# Patient Record
Sex: FEMALE | Race: WHITE | RURAL
[De-identification: names, ages, dates, MRNs, and addresses within clinical notes are randomized per-mention and may not be internally consistent; named-entity substitution may affect disease eponyms.]

---

## 2021-09-21 ENCOUNTER — HOSPITAL ENCOUNTER (EMERGENCY)
Facility: HOSPITAL | Age: 61
Discharge: ANOTHER HOSPITAL | End: 2021-09-21
Attending: HOSPITALIST
Payer: OTHER GOVERNMENT

## 2021-09-21 ENCOUNTER — APPOINTMENT (OUTPATIENT)
Dept: GENERAL RADIOLOGY | Facility: HOSPITAL | Age: 61
End: 2021-09-21
Payer: OTHER GOVERNMENT

## 2021-09-21 ENCOUNTER — APPOINTMENT (OUTPATIENT)
Dept: CT IMAGING | Facility: HOSPITAL | Age: 61
End: 2021-09-21
Payer: OTHER GOVERNMENT

## 2021-09-21 VITALS
SYSTOLIC BLOOD PRESSURE: 108 MMHG | WEIGHT: 190 LBS | TEMPERATURE: 98.2 F | OXYGEN SATURATION: 97 % | BODY MASS INDEX: 27.2 KG/M2 | RESPIRATION RATE: 22 BRPM | HEIGHT: 70 IN | HEART RATE: 123 BPM | DIASTOLIC BLOOD PRESSURE: 71 MMHG

## 2021-09-21 DIAGNOSIS — I26.99 BILATERAL PULMONARY EMBOLISM (HCC): ICD-10-CM

## 2021-09-21 DIAGNOSIS — R77.8 ELEVATED TROPONIN: ICD-10-CM

## 2021-09-21 DIAGNOSIS — J12.82 PNEUMONIA DUE TO COVID-19 VIRUS: Primary | ICD-10-CM

## 2021-09-21 DIAGNOSIS — J96.01 ACUTE RESPIRATORY FAILURE WITH HYPOXIA (HCC): ICD-10-CM

## 2021-09-21 DIAGNOSIS — R79.89 ELEVATED BRAIN NATRIURETIC PEPTIDE (BNP) LEVEL: ICD-10-CM

## 2021-09-21 DIAGNOSIS — U07.1 PNEUMONIA DUE TO COVID-19 VIRUS: Primary | ICD-10-CM

## 2021-09-21 LAB
A/G RATIO: 1.1 (ref 0.8–2)
ALBUMIN SERPL-MCNC: 3.6 G/DL (ref 3.4–4.8)
ALP BLD-CCNC: 66 U/L (ref 25–100)
ALT SERPL-CCNC: 16 U/L (ref 4–36)
ANION GAP SERPL CALCULATED.3IONS-SCNC: 13 MMOL/L (ref 3–16)
APTT: 31 SEC (ref 22.5–34.9)
AST SERPL-CCNC: 18 U/L (ref 8–33)
BASE EXCESS ARTERIAL: -5.2 MMOL/L (ref -3–3)
BASOPHILS ABSOLUTE: 0 K/UL (ref 0–0.1)
BASOPHILS RELATIVE PERCENT: 0.3 %
BILIRUB SERPL-MCNC: 0.7 MG/DL (ref 0.3–1.2)
BUN BLDV-MCNC: 7 MG/DL (ref 6–20)
CALCIUM SERPL-MCNC: 8.6 MG/DL (ref 8.5–10.5)
CHLORIDE BLD-SCNC: 103 MMOL/L (ref 98–107)
CO2: 22 MMOL/L (ref 20–30)
CREAT SERPL-MCNC: 0.6 MG/DL (ref 0.4–1.2)
D DIMER: >4 UG/ML FEU (ref 0–0.6)
EOSINOPHILS ABSOLUTE: 0 K/UL (ref 0–0.4)
EOSINOPHILS RELATIVE PERCENT: 0 %
FIO2: 0.32 %
GFR AFRICAN AMERICAN: >59
GFR NON-AFRICAN AMERICAN: >60
GLOBULIN: 3.4 G/DL
GLUCOSE BLD-MCNC: 134 MG/DL (ref 74–106)
HCO3 ARTERIAL: 16.6 MMOL/L (ref 22–26)
HCT VFR BLD CALC: 37.3 % (ref 37–47)
HEMOGLOBIN: 12.5 G/DL (ref 11.5–16.5)
IMMATURE GRANULOCYTES #: 0.1 K/UL
IMMATURE GRANULOCYTES %: 0.6 % (ref 0–5)
INR BLD: 1.23 (ref 0.88–1.11)
LACTIC ACID: 1.8 MMOL/L (ref 0.4–2)
LYMPHOCYTES ABSOLUTE: 0.9 K/UL (ref 1.5–4)
LYMPHOCYTES RELATIVE PERCENT: 8.4 %
MCH RBC QN AUTO: 29.1 PG (ref 27–32)
MCHC RBC AUTO-ENTMCNC: 33.5 G/DL (ref 31–35)
MCV RBC AUTO: 86.7 FL (ref 80–100)
MONOCYTES ABSOLUTE: 1.2 K/UL (ref 0.2–0.8)
MONOCYTES RELATIVE PERCENT: 11 %
NEUTROPHILS ABSOLUTE: 8.5 K/UL (ref 2–7.5)
NEUTROPHILS RELATIVE PERCENT: 79.7 %
O2 SAT, ARTERIAL: 95.2 %
O2 THERAPY: ABNORMAL
PCO2 ARTERIAL: 22.3 MMHG (ref 35–45)
PDW BLD-RTO: 12.7 % (ref 11–16)
PH ARTERIAL: 7.49 (ref 7.35–7.45)
PLATELET # BLD: 215 K/UL (ref 150–400)
PMV BLD AUTO: 9.8 FL (ref 6–10)
PO2 ARTERIAL: 75.4 MMHG (ref 80–100)
POTASSIUM REFLEX MAGNESIUM: 3.9 MMOL/L (ref 3.4–5.1)
PRO-BNP: 3944 PG/ML (ref 0–1800)
PROTHROMBIN TIME: 14.9 SEC (ref 11.6–13.8)
RBC # BLD: 4.3 M/UL (ref 3.8–5.8)
SODIUM BLD-SCNC: 138 MMOL/L (ref 136–145)
TCO2 ARTERIAL: 17.3 MMOL/L (ref 24–30)
TOTAL PROTEIN: 7 G/DL (ref 6.4–8.3)
TROPONIN: 0.41 NG/ML
WBC # BLD: 10.7 K/UL (ref 4–11)

## 2021-09-21 PROCEDURE — 36600 WITHDRAWAL OF ARTERIAL BLOOD: CPT

## 2021-09-21 PROCEDURE — 85730 THROMBOPLASTIN TIME PARTIAL: CPT

## 2021-09-21 PROCEDURE — 6360000004 HC RX CONTRAST MEDICATION: Performed by: HOSPITALIST

## 2021-09-21 PROCEDURE — 71045 X-RAY EXAM CHEST 1 VIEW: CPT

## 2021-09-21 PROCEDURE — 96367 TX/PROPH/DG ADDL SEQ IV INF: CPT

## 2021-09-21 PROCEDURE — 96365 THER/PROPH/DIAG IV INF INIT: CPT

## 2021-09-21 PROCEDURE — 2580000003 HC RX 258: Performed by: HOSPITALIST

## 2021-09-21 PROCEDURE — 96372 THER/PROPH/DIAG INJ SC/IM: CPT

## 2021-09-21 PROCEDURE — 96375 TX/PRO/DX INJ NEW DRUG ADDON: CPT

## 2021-09-21 PROCEDURE — 80053 COMPREHEN METABOLIC PANEL: CPT

## 2021-09-21 PROCEDURE — 36415 COLL VENOUS BLD VENIPUNCTURE: CPT

## 2021-09-21 PROCEDURE — 85025 COMPLETE CBC W/AUTO DIFF WBC: CPT

## 2021-09-21 PROCEDURE — 6360000002 HC RX W HCPCS: Performed by: HOSPITALIST

## 2021-09-21 PROCEDURE — 71275 CT ANGIOGRAPHY CHEST: CPT

## 2021-09-21 PROCEDURE — 84484 ASSAY OF TROPONIN QUANT: CPT

## 2021-09-21 PROCEDURE — 87040 BLOOD CULTURE FOR BACTERIA: CPT

## 2021-09-21 PROCEDURE — 83605 ASSAY OF LACTIC ACID: CPT

## 2021-09-21 PROCEDURE — 82803 BLOOD GASES ANY COMBINATION: CPT

## 2021-09-21 PROCEDURE — 99285 EMERGENCY DEPT VISIT HI MDM: CPT

## 2021-09-21 PROCEDURE — 96366 THER/PROPH/DIAG IV INF ADDON: CPT

## 2021-09-21 PROCEDURE — 85610 PROTHROMBIN TIME: CPT

## 2021-09-21 PROCEDURE — 85379 FIBRIN DEGRADATION QUANT: CPT

## 2021-09-21 PROCEDURE — 6370000000 HC RX 637 (ALT 250 FOR IP): Performed by: HOSPITALIST

## 2021-09-21 PROCEDURE — 83880 ASSAY OF NATRIURETIC PEPTIDE: CPT

## 2021-09-21 RX ORDER — DEXAMETHASONE SODIUM PHOSPHATE 10 MG/ML
10 INJECTION INTRAMUSCULAR; INTRAVENOUS ONCE
Status: COMPLETED | OUTPATIENT
Start: 2021-09-21 | End: 2021-09-21

## 2021-09-21 RX ORDER — FUROSEMIDE 10 MG/ML
40 INJECTION INTRAMUSCULAR; INTRAVENOUS ONCE
Status: COMPLETED | OUTPATIENT
Start: 2021-09-21 | End: 2021-09-21

## 2021-09-21 RX ORDER — HEPARIN SODIUM 1000 [USP'U]/ML
80 INJECTION, SOLUTION INTRAVENOUS; SUBCUTANEOUS ONCE
Status: CANCELLED | OUTPATIENT
Start: 2021-09-21 | End: 2021-09-21

## 2021-09-21 RX ORDER — HEPARIN SODIUM 1000 [USP'U]/ML
40 INJECTION, SOLUTION INTRAVENOUS; SUBCUTANEOUS PRN
Status: CANCELLED | OUTPATIENT
Start: 2021-09-21

## 2021-09-21 RX ORDER — 0.9 % SODIUM CHLORIDE 0.9 %
1000 INTRAVENOUS SOLUTION INTRAVENOUS ONCE
Status: COMPLETED | OUTPATIENT
Start: 2021-09-21 | End: 2021-09-21

## 2021-09-21 RX ORDER — ASPIRIN 81 MG/1
324 TABLET, CHEWABLE ORAL ONCE
Status: COMPLETED | OUTPATIENT
Start: 2021-09-21 | End: 2021-09-21

## 2021-09-21 RX ORDER — ALBUTEROL SULFATE 90 UG/1
2 AEROSOL, METERED RESPIRATORY (INHALATION) EVERY 6 HOURS PRN
Status: DISCONTINUED | OUTPATIENT
Start: 2021-09-21 | End: 2021-09-21 | Stop reason: HOSPADM

## 2021-09-21 RX ORDER — HEPARIN SODIUM 10000 [USP'U]/100ML
5-30 INJECTION, SOLUTION INTRAVENOUS CONTINUOUS
Status: CANCELLED | OUTPATIENT
Start: 2021-09-21

## 2021-09-21 RX ORDER — HEPARIN SODIUM 1000 [USP'U]/ML
80 INJECTION, SOLUTION INTRAVENOUS; SUBCUTANEOUS PRN
Status: CANCELLED | OUTPATIENT
Start: 2021-09-21

## 2021-09-21 RX ADMIN — IOPAMIDOL 100 ML: 755 INJECTION, SOLUTION INTRAVENOUS at 13:58

## 2021-09-21 RX ADMIN — FUROSEMIDE 40 MG: 10 INJECTION, SOLUTION INTRAMUSCULAR; INTRAVENOUS at 12:54

## 2021-09-21 RX ADMIN — ASPIRIN 324 MG: 81 TABLET, CHEWABLE ORAL at 12:58

## 2021-09-21 RX ADMIN — SODIUM CHLORIDE 1000 ML: 9 INJECTION, SOLUTION INTRAVENOUS at 11:44

## 2021-09-21 RX ADMIN — DEXAMETHASONE SODIUM PHOSPHATE 10 MG: 10 INJECTION INTRAMUSCULAR; INTRAVENOUS at 12:01

## 2021-09-21 RX ADMIN — AZITHROMYCIN DIHYDRATE 500 MG: 500 INJECTION, POWDER, LYOPHILIZED, FOR SOLUTION INTRAVENOUS at 13:02

## 2021-09-21 RX ADMIN — SODIUM CHLORIDE 1000 ML: 9 INJECTION, SOLUTION INTRAVENOUS at 14:30

## 2021-09-21 RX ADMIN — CEFTRIAXONE 1000 MG: 1 INJECTION, POWDER, FOR SOLUTION INTRAMUSCULAR; INTRAVENOUS at 12:05

## 2021-09-21 RX ADMIN — ALBUTEROL SULFATE 2 PUFF: 90 AEROSOL, METERED RESPIRATORY (INHALATION) at 14:32

## 2021-09-21 RX ADMIN — ENOXAPARIN SODIUM 90 MG: 100 INJECTION SUBCUTANEOUS at 12:59

## 2021-09-21 ASSESSMENT — PAIN DESCRIPTION - FREQUENCY: FREQUENCY: INTERMITTENT

## 2021-09-21 ASSESSMENT — PAIN SCALES - GENERAL: PAINLEVEL_OUTOF10: 5

## 2021-09-21 ASSESSMENT — PAIN DESCRIPTION - LOCATION: LOCATION: RIB CAGE

## 2021-09-21 ASSESSMENT — PAIN DESCRIPTION - ORIENTATION: ORIENTATION: LEFT

## 2021-09-21 NOTE — ED NOTES
Pt on 15L NRB at this time with O2 sat at 98%- NRB removed and patient placed on O2 via NC at [employer], RN  09/21/21 9903

## 2021-09-21 NOTE — ED NOTES
Dr. Austen Rooney on phone with daughter per dr. Osvaldo Townsend updating her on patient status      Radha Rainey RN  09/21/21 1152

## 2021-09-21 NOTE — ED NOTES
Pt presented to ER via [facility]- c/o increased SOA over past couple of days- c/o increased weakness over past couple days- c/o left rib pain with deep breathing and tender to touch- states she tested positive on home test for COVID on 9/6/21- pt states PCP gave her an albuterol inhaler to take at home with no improvement- pt states she has no appetite at this time      Susy Olivas RN  09/21/21 1426

## 2021-09-21 NOTE — ED PROVIDER NOTES
[facility] ENCOUNTER      Pt Name: Mary Dutton  MRN: [id number]  YOB: 1960  Date of evaluation: 9/21/2021  Provider: Godwin Arana, [address] [place] [address]       Chief Complaint   Patient presents with    Shortness of Breath     pt c/o increased SOA- stated she tested positivie on home COVID test on Sept 6th    Fatigue     worsening over past couple days    Rib Pain     pt c/o pain to left rib area- tender to touch         HISTORY OF PRESENT ILLNESS  (Location/Symptom, Timing/Onset, Context/Setting, Quality, Duration, Modifying Factors, Severity.)   Mary Dutton is a 62 y.o. female who presents to the emergency department for COVID-19 symptoms. Patient states that she tested positive for COVID-19 on 6 September Labor Day. States that her  also tested positive. They believe they most likely got exposed when he had to have an angioplasty performed and they spent the entire day at the hospital.  Patient states that she thought she was actually getting better but over the last several days as she has had worsening of her symptoms. Patient states that she now has increased shortness of breath. She states is difficult for her to even get up and ambulate even to the bathroom. She does states that she feels extremely hot when she exerts herself and has extreme weakness. Patient states that she has had worsening fatigue over the last couple of days and she also complains of some mild rib or chest discomfort in the left anterior/lateral aspect of ribs she puts her hand right under her breast area and states that it is uncomfortable. She does have a cough. It is dry in nature. Denies any chest pain except for the discomfort to the ribs itself. Denies any vomiting or diarrhea but she states she has had some nausea. She is not been eating or drinking much she is because she just has not had much of an appetite.   She states that she initially lost her taste and smell on the first part of when her illness for started but she states that has returned that she has just not had the /energy/appetite to eat or drink much. She denies any abdominal pain out of ordinary. Denies any dysuria or change urinary frequency. She denies any headaches with the symptoms. Denies any visual changes. Denies any sore throat or earache out of ordinary. Denies any numbness tingling weakness of the extremities out of ordinary but she states that she does have sort of all full body type myalgias or aches. Denies any fevers or chills at this time but states she did have some initially. Patient was brought in by EMS and was actually on a nonrebreather initially but she was downgraded to a nasal cannula she is on 3 L at this time and she is satting 92 to 93% on the 3 L nasal cannula. Nursing notes were reviewed. REVIEW OFSYSTEMS    (2-9 systems for level 4, 10 or more for level 5)   ROS:  General:  No fevers, no chills, + weakness, +fatigue, +loss of appetite  Cardiovascular:  +chest pain, no palpitations  Respiratory:  + shortness of breath, +cough, no wheezing  Gastrointestinal:  No pain, + nausea, no vomiting, no diarrhea  Musculoskeletal:  No muscle pain, no joint pain  Skin:  No rash, no easy bruising  Neurologic:  No speech problems, no headache, no extremity weakness  Psychiatric:  No anxiety  Genitourinary:  No dysuria, no hematuria    Except as noted above the remainder of the review of systems was reviewed and negative. PAST MEDICAL HISTORY   History reviewed. No pertinent past medical history. SURGICAL HISTORY       Past Surgical History:   Procedure Laterality Date    HYSTERECTOMY      KNEE SURGERY Left          CURRENT MEDICATIONS       Previous Medications    ALBUTEROL IN    Inhale into the lungs as needed       ALLERGIES     Pcn [penicillins]    FAMILY HISTORY     History reviewed. No pertinent family history.        SOCIAL HISTORY       Social History     Socioeconomic History    Marital status: Unknown     Spouse name: None    Number of children: None    Years of education: None    Highest education level: None   Occupational History    None   Tobacco Use    Smoking status: Former Smoker    Smokeless tobacco: Never Used   Substance and Sexual Activity    Alcohol use: Not Currently    Drug use: Never    Sexual activity: None   Other Topics Concern    None   Social History Narrative    None     Social Determinants of Health     Financial Resource Strain:     Difficulty of Paying Living Expenses:    Food Insecurity:     Worried About Running Out of Food in the Last Year:     Ran Out of Food in the Last Year:    Transportation Needs:     Lack of Transportation (Medical):  Lack of Transportation (Non-Medical):    Physical Activity:     Days of Exercise per Week:     Minutes of Exercise per Session:    Stress:     Feeling of Stress :    Social Connections:     Frequency of Communication with Friends and Family:     Frequency of Social Gatherings with Friends and Family:     Attends Faith Services:     Active Member of Clubs or Organizations:     Attends Club or Organization Meetings:     Marital Status:    Intimate Partner Violence:     Fear of Current or Ex-Partner:     Emotionally Abused:     Physically Abused:     Sexually Abused:          PHYSICAL EXAM    (up to 7 for level 4, 8 or more for level 5)     ED Triage Vitals   BP Temp Temp src Pulse Resp SpO2 Height Weight   09/21/21 1106 09/21/21 1106 -- 09/21/21 1106 -- 09/21/21 1055 09/21/21 1111 09/21/21 1111   (!) 116/90 98.2 °F (36.8 °C)  123  98 % 5' 10\" (1.778 m) 190 lb (86.2 kg)       Physical Exam  General :Patient is awake, alert, oriented x3, in mild respiratory distress, ill aprearing  HEENT: Pupils are equally round and reactive to light, EOMI, conjunctivae clear. Oral mucosa is moist, no exudate.  Uvula is midline  Cardiac: Heart regular rate, rhythm, no murmurs, rubs, or gallops  Lungs: Lungs are clear to auscultation, there is no wheezing, rhonchi, or rales. There is no use of accessory muscles. CHEST: There is a very mild palpable tenderness at the anterior and lateral ribs just below the breast line on the left. There is no contusion or bruising noted. No paradoxical movement. No crepitus noted. Abdomen: Abdomen is soft, nontender, nondistended. There is no firm or pulsatile masses, no rebound rigidity or guarding. Musculoskeletal: 5 out of 5 strength in all 4 extremities. No focal muscle deficits are appreciated  Neuro: Motor intact, sensory intact, level of consciousness is normal  Dermatology: Skin is warm and dry  Psych: Mentation is grossly normal, cognition is grossly normal. Affect is appropriate. DIAGNOSTIC RESULTS     EKG: All EKG's are interpreted by the Emergency Department Physician who either signs or Co-signs this chart in the [address] a cardiologist.    The EKG interpreted by me shows sinus tachycardia. Atypical right bundle branch block pattern. Heart rate is 117 bpm, WA interval is 157 ms, QRS durations 148 ms, QT is 359 and QTc is 501 ms. No acute T wave inversions concerning for acute microischemia. No ST elevations concerning for acute myocardial infarction. RADIOLOGY:   Non-plain film images such as CT, Ultrasound and MRI are read by the radiologist. Plain radiographic images are visualized and preliminarily interpreted by the emergency physician with the below findings:      ? Radiologist's Report Reviewed:  CTA PULMONARY W CONTRAST   Final Result   1. Bilateral submassive pulmonary emboli with thrombus of the proximal lobar arteries bilaterally involving all pulmonary lobes. 2. CT evidence for right heart strain with an elevated right to left ventricular ratio. 3. Findings reflecting Covid pneumonia. These findings were discussed with Dr. Keyla Hayden at the time of report.                XR CHEST PORTABLE   Final Result   1. Multifocal peripheral airspace consolidation concordant with atypical viral pneumonia.             ED BEDSIDE ULTRASOUND:   Performed by ED Physician - none    LABS:    I have reviewed and interpreted all of the currently available lab results from this visit (ifapplicable):  Results for orders placed or performed during the hospital encounter of 09/21/21   CBC Auto Differential   Result Value Ref Range    WBC 10.7 4.0 - 11.0 K/uL    RBC 4.30 3.80 - 5.80 M/uL    Hemoglobin 12.5 11.5 - 16.5 g/dL    Hematocrit 37.3 37.0 - 47.0 %    MCV 86.7 80.0 - 100.0 fL    MCH 29.1 27.0 - 32.0 pg    MCHC 33.5 31.0 - 35.0 g/dL    RDW 12.7 11.0 - 16.0 %    Platelets 146 121 - 780 K/uL    MPV 9.8 6.0 - 10.0 fL    Neutrophils % 79.7 %    Immature Granulocytes % 0.6 0.0 - 5.0 %    Lymphocytes % 8.4 %    Monocytes % 11.0 %    Eosinophils % 0.0 %    Basophils % 0.3 %    Neutrophils Absolute 8.5 (H) 2.0 - 7.5 K/uL    Immature Granulocytes # 0.1 K/uL    Lymphocytes Absolute 0.9 (L) 1.5 - 4.0 K/uL    Monocytes Absolute 1.2 (H) 0.2 - 0.8 K/uL    Eosinophils Absolute 0.0 0.0 - 0.4 K/uL    Basophils Absolute 0.0 0.0 - 0.1 K/uL   Comprehensive Metabolic Panel w/ Reflex to MG   Result Value Ref Range    Sodium 138 136 - 145 mmol/L    Potassium reflex Magnesium 3.9 3.4 - 5.1 mmol/L    Chloride 103 98 - 107 mmol/L    CO2 22 20 - 30 mmol/L    Anion Gap 13 3 - 16    Glucose 134 (H) 74 - 106 mg/dL    BUN 7 6 - 20 mg/dL    CREATININE 0.6 0.4 - 1.2 mg/dL    GFR Non-African American >60 >59    GFR African American >59 >59    Calcium 8.6 8.5 - 10.5 mg/dL    Total Protein 7.0 6.4 - 8.3 g/dL    Albumin 3.6 3.4 - 4.8 g/dL    Albumin/Globulin Ratio 1.1 0.8 - 2.0    Total Bilirubin 0.7 0.3 - 1.2 mg/dL    Alkaline Phosphatase 66 25 - 100 U/L    ALT 16 4 - 36 U/L    AST 18 8 - 33 U/L    Globulin 3.4 g/dL   Troponin   Result Value Ref Range    Troponin 0.41 (HH) <0.30 ng/mL   Brain Natriuretic Peptide   Result Value Ref Range Pro-BNP 3,944 (H) 0 - 1,800 pg/mL   D-Dimer, Quantitative   Result Value Ref Range    D-Dimer, Quant >4.00 (HH) 0.00 - 0.60 ug/mL FEU   Lactic Acid, Plasma   Result Value Ref Range    Lactic Acid 1.8 0.4 - 2.0 mmol/L   Protime-INR   Result Value Ref Range    Protime 14.9 (H) 11.6 - 13.8 sec    INR 1.23 (H) 0.88 - 1.11   APTT   Result Value Ref Range    aPTT 31.0 22.5 - 34.9 sec   Blood Gas, Arterial   Result Value Ref Range    pH, Arterial 7.490 (H) 7.350 - 7.450    pCO2, Arterial 22.3 (L) 35.0 - 45.0 mmHg    pO2, Arterial 75.4 (L) 80.0 - 100.0 mmHg    HCO3, Arterial 16.6 (L) 22.0 - 26.0 mmol/L    Base Excess, Arterial -5.2 (L) -3.0 - 3.0 mmol/L    O2 Sat, Arterial 95.2 >92 %    TCO2, Arterial 17.3 (L) 24.0 - 30.0 mmol/L    O2 Therapy Unknown     FIO2 0.32 Not Established %        All other labs were within normal range or not returned as of this dictation.     EMERGENCY DEPARTMENT COURSE and DIFFERENTIAL DIAGNOSIS/MDM:   Vitals:    Vitals:    09/21/21 1405 09/21/21 1415 09/21/21 1434 09/21/21 1500   BP: 99/74 88/62 93/74 (!) 82/46   Pulse: 127 126 128 121   Resp: 24 21 28 20   Temp:       SpO2: 94% 95% 96%    Weight:       Height:           MEDICATIONS ADMINISTERED IN ED:  Medications   albuterol sulfate  (90 Base) MCG/ACT inhaler 2 puff (2 puffs Inhalation Given 9/21/21 1432)   0.9 % sodium chloride bolus (0 mLs IntraVENous Stopped 9/21/21 1245)   dexamethasone (DECADRON) injection 10 mg (10 mg IntraVENous Given 9/21/21 1201)   cefTRIAXone (ROCEPHIN) 1000 mg IVPB in 50 mL D5W minibag (0 mg IntraVENous Stopped 9/21/21 1258)   azithromycin (ZITHROMAX) 500 mg in D5W 250ml addavial (0 mg IntraVENous Stopped 9/21/21 1500)   aspirin chewable tablet 324 mg (324 mg Oral Given 9/21/21 1258)   enoxaparin (LOVENOX) injection 90 mg (90 mg SubCUTAneous Given 9/21/21 1259)   furosemide (LASIX) injection 40 mg (40 mg IntraVENous Given 9/21/21 1254)   iopamidol (ISOVUE-370) 76 % injection 100 mL (100 mLs IntraVENous Given 9/21/21 1358)   0.9 % sodium chloride bolus (0 mLs IntraVENous Stopped 9/21/21 1505)       After initial evaluation examination I did have a conversation with the patient about the upcoming plan, treatment possible disposition which they were agreeable to the times dictation. Advised that we would going give her fluid bolus normal saline 1 L. We will provide her with Decadron 10 mg IV for her shortness of breath and respiratory distress. She is actually in acute hypoxic respiratory failure requiring supplemental oxygen. Patient does not use oxygen at home. We will go ahead and cover her with Rocephin and Zithromax for concern of pneumonia. Patient had portable chest radiograph performed. She also have CBC, CMP, troponin, BNP, D-dimer, lactic acid and blood cultures x2. Patient also twelve-lead EKG performed. Patient's final disposition will be determined once her radiological diagnostic studies been performed reviewed however she does meet criteria for admission at this time because of her acute hypoxic respiratory failure. Patient is agreeable to this and understands we do not have any beds available here at this time and will most likely need to be transferred to another facility unless a bed became available here. She does state her understanding of this. Blood work showed white count 10,700, hemoglobin is 12.5, hematocrit 37.3, platelet counts 873. Lactic acid normal at 1.8. D-dimer was greater than 4. Prehensile metabolic panel was benign except for glucose 134. Troponin was elevated at 0.41.  proBNP was elevated at 3944. Portable chest radiograph read by radiology as the focal peripheral airspace consolidation concordant with atypical viral pneumonia. Patient's radiological diagnostic studies were discussed with her she does state her understanding. Patient advised that with her D-dimer being elevated we will need to perform CTA of the chest to rule out pulmonary embolism.   Patient does state her understanding of this. CTA of the chest with pulmonary embolism protocol read by radiology as bilateral submassive pulmonary emboli with thrombus of the proximal lobar arteries bilaterally involving all pulmonary lobes. CT evidence for right heart strain with an elevated right to left ventricular ratio. Findings reflecting Covid pneumonia. Patient's radiological studies were discussed with her she does state her understanding. Patient advised the critical findings found on her CT of the chest to the bilateral PEs. We will go ahead and place her on a heparin drip at this time. She became very short of breath when she stood up to use the bathroom and is she is now tachycardic with a heart rate of 130. She was diuresing with the Lasix because her elevated BNP but now she is requiring another fluid bolus because she became hypotensive at 88. 6. Radiologist called with the critical findings on the CT scan and she does have findings concerning for right heart strain on the CT scan itself. Patient advised these critical findings we will have to try to someplace to transfer this patient to. Patient is still on 3 L nasal cannula satting 94% with this. Contact the [address] [place] [address] to see if we cannot start trying to find a facility that this patient can be transferred to for higher level of care because she does have bilateral pulmonary emboli on top of COVID-19 pneumonia with acute hypoxic respiratory failure. Case discussed with Sherry Ceballos at the [address] [place] [address] she did agree to accept the patient in transfer there to the emergency department overriding their divert. Patient be transferred onto the nursing staff emergency department for further evaluation work-up for bilateral Covid pneumonia, bilateral submassive pulmonary emboli, acute hypoxic respiratory failure, elevated troponin secondary to right heart strain and elevated BNP.   Patient's radiological diagnostic studies will accompany her at time of transfer. ED crit    CRITICAL CARE:  The high probability of sudden, clinically significant deterioration in the patient's condition required the highest level of my preparedness to intervene urgently. The services I provided to this patient were to treat and/or prevent clinically significant deterioration that could result in: In cardiopulmonary symptoms/compromise/demise. Services included the following: chart data review, reviewing nursing notes and/or old charts, documentation time, consultant collaboration regarding findings and treatment options, medication orders and management, direct patient care, re-evaluations, vital sign assessments and ordering, interpreting and reviewing diagnostic studies/lab tests. Aggregate critical care time was 45 minutes, which includes only time during which I was engaged in work directly related to the patient's care, as described above, whether at the bedside or elsewhere in the Emergency Department. It did not include time spent performing other reported procedures or the services of residents, students, nurses or physician assistants. CONSULTS:  None    PROCEDURES:  Procedures    CRITICAL CARE TIME    Total Critical Care time was 0 minutes, excluding separately reportable procedures. There was a high probability of clinically significant/life threatening deterioration in the patient's condition which required my urgent intervention. FINAL IMPRESSION      1. Pneumonia due to COVID-19 virus    2. Elevated troponin    3. Elevated brain natriuretic peptide (BNP) level    4. Bilateral pulmonary embolism ([address]    5. Acute respiratory failure with hypoxia (HCC)          DISPOSITION/PLAN   DISPOSITION        PATIENT REFERRED TO:  No follow-up provider specified.     DISCHARGE MEDICATIONS:  New Prescriptions    No medications on file       Comment: Please note this report has been produced using speech recognition software and may contain errorsrelated to that system including errors in grammar, punctuation, and spelling, as well as words and phrases that may be inappropriate. If there are any questions or concerns please feel free to contact the dictating providerfor clarification.     Izabella Hui DO  Attending Emergency Physician              Izabella Hui,   09/21/21 5267

## 2021-09-21 NOTE — ED NOTES
Attempted to call report and was advised nurse would call me back- I advised them that patient was already en route to [facility] because we had given report to ER- staff stated she would have the nurse call me back as soon as she could     Reyna Wesley RN  09/21/21 0897

## 2021-09-21 NOTE — ED NOTES
Report to Jeana Acuna at [facility]  09/21/21 3035

## 2021-09-21 NOTE — ED NOTES
Justin Thompson called from [facility] at this time in regards to the patient, asked if there is any other needs for her from them, she was told no d/t patient accepted for transfer to [facility].      Laquita Arnold RN  09/21/21 3069

## 2021-09-21 NOTE — ED NOTES
Report given to Cipriano Saeed RN at [employer], [address] [place] [address]  09/21/21 5932

## 2021-09-21 NOTE — ED NOTES
Cleo Bliss from [facility] called back and spoke to Erinn Wilson RN wanting us to call report to [phone] RN  09/21/21 7109

## 2021-09-21 NOTE — ED NOTES
Access center called to get pt on wait list on surrounding hospitals for ICU covid+ bed.       [facility]  09/21/21 2559

## 2021-09-25 LAB
BLOOD CULTURE, ROUTINE: NORMAL
CULTURE, BLOOD 2: NORMAL